# Patient Record
Sex: FEMALE | Race: WHITE | NOT HISPANIC OR LATINO | Employment: OTHER | URBAN - METROPOLITAN AREA
[De-identification: names, ages, dates, MRNs, and addresses within clinical notes are randomized per-mention and may not be internally consistent; named-entity substitution may affect disease eponyms.]

---

## 2017-04-18 ENCOUNTER — IMPORTED ENCOUNTER (OUTPATIENT)
Dept: URBAN - METROPOLITAN AREA CLINIC 31 | Facility: CLINIC | Age: 62
End: 2017-04-18

## 2017-04-18 PROBLEM — H40.11X3: Noted: 2017-04-18

## 2017-04-18 PROBLEM — H18.603: Noted: 2017-04-18

## 2017-04-18 PROBLEM — H25.12: Noted: 2017-04-18

## 2017-04-18 PROBLEM — H40.11X2: Noted: 2017-04-18

## 2017-04-18 PROBLEM — Z94.7: Noted: 2017-04-18

## 2017-04-18 PROCEDURE — 99214 OFFICE O/P EST MOD 30 MIN: CPT

## 2017-08-18 ENCOUNTER — IMPORTED ENCOUNTER (OUTPATIENT)
Dept: URBAN - METROPOLITAN AREA CLINIC 31 | Facility: CLINIC | Age: 62
End: 2017-08-18

## 2017-08-18 PROBLEM — H18.603: Noted: 2017-08-18

## 2017-08-18 PROBLEM — H25.12: Noted: 2017-08-18

## 2017-08-18 PROBLEM — Z94.7: Noted: 2017-08-18

## 2017-08-18 PROBLEM — H40.1132: Noted: 2017-08-18

## 2017-08-18 PROCEDURE — 92014 COMPRE OPH EXAM EST PT 1/>: CPT

## 2017-08-18 PROCEDURE — 92133 CPTRZD OPH DX IMG PST SGM ON: CPT

## 2017-12-19 ENCOUNTER — IMPORTED ENCOUNTER (OUTPATIENT)
Dept: URBAN - METROPOLITAN AREA CLINIC 31 | Facility: CLINIC | Age: 62
End: 2017-12-19

## 2017-12-19 PROBLEM — T86.840: Noted: 2017-12-19

## 2017-12-19 PROBLEM — H40.1132: Noted: 2017-12-19

## 2017-12-19 PROBLEM — H18.603: Noted: 2017-12-19

## 2017-12-19 PROCEDURE — 99213 OFFICE O/P EST LOW 20 MIN: CPT

## 2017-12-29 ENCOUNTER — IMPORTED ENCOUNTER (OUTPATIENT)
Dept: URBAN - METROPOLITAN AREA CLINIC 31 | Facility: CLINIC | Age: 62
End: 2017-12-29

## 2017-12-29 PROBLEM — H40.1132: Noted: 2017-12-29

## 2017-12-29 PROBLEM — H18.603: Noted: 2017-12-29

## 2017-12-29 PROBLEM — T86.840: Noted: 2017-12-29

## 2017-12-29 PROCEDURE — 99213 OFFICE O/P EST LOW 20 MIN: CPT

## 2018-01-05 ENCOUNTER — IMPORTED ENCOUNTER (OUTPATIENT)
Dept: URBAN - METROPOLITAN AREA CLINIC 31 | Facility: CLINIC | Age: 63
End: 2018-01-05

## 2018-01-05 PROCEDURE — 99213 OFFICE O/P EST LOW 20 MIN: CPT

## 2018-01-08 PROBLEM — H16.142: Noted: 2018-01-08

## 2018-01-08 PROBLEM — H18.603: Noted: 2018-01-05

## 2018-01-08 PROBLEM — T86.840: Noted: 2018-01-05

## 2018-01-08 PROBLEM — H04.122: Noted: 2018-01-05

## 2018-01-16 ENCOUNTER — IMPORTED ENCOUNTER (OUTPATIENT)
Dept: URBAN - METROPOLITAN AREA CLINIC 31 | Facility: CLINIC | Age: 63
End: 2018-01-16

## 2018-01-16 PROBLEM — H40.042: Noted: 2018-01-16

## 2018-01-16 PROBLEM — H40.1132: Noted: 2018-01-16

## 2018-01-16 PROBLEM — H04.122: Noted: 2018-01-16

## 2018-01-16 PROBLEM — H18.603: Noted: 2018-01-16

## 2018-01-16 PROBLEM — T86.840: Noted: 2018-01-16

## 2018-01-16 PROCEDURE — 99213 OFFICE O/P EST LOW 20 MIN: CPT

## 2018-01-31 ENCOUNTER — IMPORTED ENCOUNTER (OUTPATIENT)
Dept: URBAN - METROPOLITAN AREA CLINIC 31 | Facility: CLINIC | Age: 63
End: 2018-01-31

## 2018-01-31 PROBLEM — H18.603: Noted: 2018-01-31

## 2018-01-31 PROBLEM — T86.840: Noted: 2018-01-31

## 2018-01-31 PROBLEM — H40.1132: Noted: 2018-01-31

## 2018-01-31 PROBLEM — H04.122: Noted: 2018-01-31

## 2018-01-31 PROBLEM — H40.042: Noted: 2018-01-31

## 2018-01-31 PROCEDURE — 99213 OFFICE O/P EST LOW 20 MIN: CPT

## 2018-02-07 ENCOUNTER — IMPORTED ENCOUNTER (OUTPATIENT)
Dept: URBAN - METROPOLITAN AREA CLINIC 31 | Facility: CLINIC | Age: 63
End: 2018-02-07

## 2018-02-07 PROBLEM — H18.601: Noted: 2018-02-07

## 2018-02-07 PROBLEM — Z94.7: Noted: 2018-02-07

## 2018-02-07 PROCEDURE — 99213 OFFICE O/P EST LOW 20 MIN: CPT

## 2018-02-14 ENCOUNTER — IMPORTED ENCOUNTER (OUTPATIENT)
Dept: URBAN - METROPOLITAN AREA CLINIC 31 | Facility: CLINIC | Age: 63
End: 2018-02-14

## 2018-02-14 PROBLEM — H18.603: Noted: 2018-02-14

## 2018-02-14 PROBLEM — Z94.7: Noted: 2018-02-14

## 2018-02-14 PROBLEM — H40.043: Noted: 2018-02-14

## 2018-02-14 PROBLEM — H16.223: Noted: 2018-02-14

## 2018-02-14 PROCEDURE — 99213 OFFICE O/P EST LOW 20 MIN: CPT

## 2018-02-15 ENCOUNTER — IMPORTED ENCOUNTER (OUTPATIENT)
Dept: URBAN - METROPOLITAN AREA CLINIC 31 | Facility: CLINIC | Age: 63
End: 2018-02-15

## 2018-03-05 ENCOUNTER — IMPORTED ENCOUNTER (OUTPATIENT)
Dept: URBAN - METROPOLITAN AREA CLINIC 31 | Facility: CLINIC | Age: 63
End: 2018-03-05

## 2018-03-15 ENCOUNTER — IMPORTED ENCOUNTER (OUTPATIENT)
Dept: URBAN - METROPOLITAN AREA CLINIC 31 | Facility: CLINIC | Age: 63
End: 2018-03-15

## 2018-03-15 PROBLEM — Z94.7: Noted: 2018-03-15

## 2018-03-15 PROBLEM — H18.601: Noted: 2018-03-15

## 2018-03-15 PROBLEM — H40.042: Noted: 2018-03-15

## 2018-03-15 PROCEDURE — 99213 OFFICE O/P EST LOW 20 MIN: CPT

## 2018-06-06 ENCOUNTER — IMPORTED ENCOUNTER (OUTPATIENT)
Dept: URBAN - METROPOLITAN AREA CLINIC 31 | Facility: CLINIC | Age: 63
End: 2018-06-06

## 2018-06-06 PROBLEM — H40.1132: Noted: 2018-06-06

## 2018-06-06 PROBLEM — H18.601: Noted: 2018-06-06

## 2018-06-06 PROBLEM — Z94.7: Noted: 2018-06-06

## 2018-06-06 PROCEDURE — 99214 OFFICE O/P EST MOD 30 MIN: CPT

## 2018-06-06 PROCEDURE — 92083 EXTENDED VISUAL FIELD XM: CPT

## 2018-07-13 ENCOUNTER — IMPORTED ENCOUNTER (OUTPATIENT)
Dept: URBAN - METROPOLITAN AREA CLINIC 31 | Facility: CLINIC | Age: 63
End: 2018-07-13

## 2018-07-13 PROBLEM — H40.1132: Noted: 2018-07-13

## 2018-07-13 PROBLEM — H10.32: Noted: 2018-07-13

## 2018-07-13 PROBLEM — H18.601: Noted: 2018-07-13

## 2018-07-13 PROCEDURE — 99213 OFFICE O/P EST LOW 20 MIN: CPT

## 2018-07-17 ENCOUNTER — IMPORTED ENCOUNTER (OUTPATIENT)
Dept: URBAN - METROPOLITAN AREA CLINIC 31 | Facility: CLINIC | Age: 63
End: 2018-07-17

## 2018-07-17 PROBLEM — Z94.7: Noted: 2018-07-17

## 2018-07-17 PROBLEM — H18.601: Noted: 2018-07-17

## 2018-07-17 PROCEDURE — 99213 OFFICE O/P EST LOW 20 MIN: CPT

## 2018-08-15 ENCOUNTER — IMPORTED ENCOUNTER (OUTPATIENT)
Dept: URBAN - METROPOLITAN AREA CLINIC 31 | Facility: CLINIC | Age: 63
End: 2018-08-15

## 2018-08-15 PROBLEM — H25.12: Noted: 2018-08-15

## 2018-08-15 PROBLEM — Z94.7: Noted: 2018-08-15

## 2018-08-15 PROBLEM — H40.1132: Noted: 2018-08-15

## 2018-08-15 PROBLEM — H18.601: Noted: 2018-08-15

## 2018-08-15 PROCEDURE — 99214 OFFICE O/P EST MOD 30 MIN: CPT

## 2018-08-28 ENCOUNTER — IMPORTED ENCOUNTER (OUTPATIENT)
Dept: URBAN - METROPOLITAN AREA CLINIC 31 | Facility: CLINIC | Age: 63
End: 2018-08-28

## 2018-08-28 PROBLEM — H25.12: Noted: 2018-08-28

## 2018-08-28 PROBLEM — Z94.7: Noted: 2018-08-28

## 2018-08-28 PROBLEM — H40.1132: Noted: 2018-08-28

## 2018-08-28 PROBLEM — H18.601: Noted: 2018-08-28

## 2018-08-28 PROCEDURE — 99213 OFFICE O/P EST LOW 20 MIN: CPT

## 2018-09-07 ENCOUNTER — IMPORTED ENCOUNTER (OUTPATIENT)
Dept: URBAN - METROPOLITAN AREA CLINIC 31 | Facility: CLINIC | Age: 63
End: 2018-09-07

## 2018-09-07 PROBLEM — H40.1132: Noted: 2018-09-07

## 2018-09-07 PROBLEM — H18.601: Noted: 2018-09-07

## 2018-09-07 PROBLEM — Z94.7: Noted: 2018-09-07

## 2018-09-07 PROCEDURE — 99213 OFFICE O/P EST LOW 20 MIN: CPT

## 2019-01-23 ENCOUNTER — IMPORTED ENCOUNTER (OUTPATIENT)
Dept: URBAN - METROPOLITAN AREA CLINIC 31 | Facility: CLINIC | Age: 64
End: 2019-01-23

## 2019-01-23 PROBLEM — H25.12: Noted: 2019-01-23

## 2019-01-23 PROBLEM — H40.042: Noted: 2019-01-23

## 2019-01-23 PROBLEM — Z94.7: Noted: 2019-01-23

## 2019-01-23 PROBLEM — H18.603: Noted: 2019-01-23

## 2019-01-23 PROBLEM — H44.23: Noted: 2019-01-23

## 2019-01-23 PROCEDURE — 92133 CPTRZD OPH DX IMG PST SGM ON: CPT

## 2019-01-23 PROCEDURE — 99214 OFFICE O/P EST MOD 30 MIN: CPT

## 2019-01-25 ENCOUNTER — IMPORTED ENCOUNTER (OUTPATIENT)
Dept: URBAN - METROPOLITAN AREA CLINIC 31 | Facility: CLINIC | Age: 64
End: 2019-01-25

## 2019-01-25 PROBLEM — H25.12: Noted: 2019-01-25

## 2019-01-25 PROBLEM — Z94.7: Noted: 2019-01-25

## 2019-01-25 PROBLEM — H18.603: Noted: 2019-01-25

## 2019-01-25 PROBLEM — H40.042: Noted: 2019-01-25

## 2019-01-25 PROBLEM — H44.23: Noted: 2019-01-25

## 2019-01-25 PROCEDURE — 99213 OFFICE O/P EST LOW 20 MIN: CPT

## 2019-01-25 PROCEDURE — 92025 CPTRIZED CORNEAL TOPOGRAPHY: CPT

## 2019-05-20 ENCOUNTER — IMPORTED ENCOUNTER (OUTPATIENT)
Dept: URBAN - METROPOLITAN AREA CLINIC 31 | Facility: CLINIC | Age: 64
End: 2019-05-20

## 2019-05-20 PROBLEM — Z94.7: Noted: 2019-05-20

## 2019-05-20 PROBLEM — H40.042: Noted: 2019-05-20

## 2019-05-20 PROBLEM — H44.23: Noted: 2019-05-20

## 2019-05-20 PROBLEM — H25.12: Noted: 2019-05-20

## 2019-05-20 PROBLEM — H18.603: Noted: 2019-05-20

## 2019-05-20 PROCEDURE — 99213 OFFICE O/P EST LOW 20 MIN: CPT

## 2019-05-31 ENCOUNTER — IMPORTED ENCOUNTER (OUTPATIENT)
Dept: URBAN - METROPOLITAN AREA CLINIC 31 | Facility: CLINIC | Age: 64
End: 2019-05-31

## 2019-05-31 PROBLEM — Z94.7: Noted: 2019-05-31

## 2019-05-31 PROBLEM — H44.23: Noted: 2019-05-31

## 2019-05-31 PROBLEM — H40.1132: Noted: 2019-05-31

## 2019-05-31 PROBLEM — H18.601: Noted: 2019-05-31

## 2019-05-31 PROBLEM — H25.12: Noted: 2019-05-31

## 2019-05-31 PROCEDURE — 92083 EXTENDED VISUAL FIELD XM: CPT

## 2019-05-31 PROCEDURE — 99214 OFFICE O/P EST MOD 30 MIN: CPT

## 2019-08-30 ENCOUNTER — IMPORTED ENCOUNTER (OUTPATIENT)
Dept: URBAN - METROPOLITAN AREA CLINIC 31 | Facility: CLINIC | Age: 64
End: 2019-08-30

## 2019-08-30 PROBLEM — H25.12: Noted: 2019-08-30

## 2019-08-30 PROBLEM — H44.23: Noted: 2019-08-30

## 2019-08-30 PROBLEM — Z94.7: Noted: 2019-08-30

## 2019-08-30 PROBLEM — H40.1132: Noted: 2019-08-30

## 2019-08-30 PROBLEM — H18.601: Noted: 2019-08-30

## 2019-08-30 PROCEDURE — 99213 OFFICE O/P EST LOW 20 MIN: CPT

## 2019-09-16 NOTE — PATIENT DISCUSSION
CHALAZION OS: PRESCRIBED WARM COMPRESSES, EYELID SCRUBS AND DOXYCYCLINE 100MG BID PO X 2 WEEKS AND TOBRADEX RISHABH QHS X 2 WEEKS. CHALAZION INCISION AND DRAINAGE DONE TODAY.

## 2019-09-25 ENCOUNTER — IMPORTED ENCOUNTER (OUTPATIENT)
Dept: URBAN - METROPOLITAN AREA CLINIC 31 | Facility: CLINIC | Age: 64
End: 2019-09-25

## 2019-09-25 PROBLEM — Z94.7: Noted: 2019-09-25

## 2019-09-25 PROBLEM — H18.601: Noted: 2019-09-25

## 2019-09-25 PROBLEM — H25.12: Noted: 2019-09-25

## 2019-09-25 PROBLEM — H44.23: Noted: 2019-09-25

## 2019-09-25 PROBLEM — H40.1132: Noted: 2019-09-25

## 2019-09-25 PROCEDURE — 99214 OFFICE O/P EST MOD 30 MIN: CPT

## 2019-12-20 ENCOUNTER — IMPORTED ENCOUNTER (OUTPATIENT)
Dept: URBAN - METROPOLITAN AREA CLINIC 31 | Facility: CLINIC | Age: 64
End: 2019-12-20

## 2019-12-20 PROBLEM — H25.13: Noted: 2019-12-20

## 2019-12-20 PROBLEM — H18.601: Noted: 2019-12-20

## 2019-12-20 PROBLEM — Z94.7: Noted: 2019-12-20

## 2019-12-20 PROBLEM — H44.23: Noted: 2019-12-20

## 2019-12-20 PROBLEM — H25.12: Noted: 2019-12-20

## 2019-12-20 PROBLEM — H40.1132: Noted: 2019-12-20

## 2019-12-20 PROCEDURE — 92014 COMPRE OPH EXAM EST PT 1/>: CPT

## 2019-12-20 PROCEDURE — 92133 CPTRZD OPH DX IMG PST SGM ON: CPT

## 2020-01-10 ENCOUNTER — IMPORTED ENCOUNTER (OUTPATIENT)
Dept: URBAN - METROPOLITAN AREA CLINIC 31 | Facility: CLINIC | Age: 65
End: 2020-01-10

## 2020-01-10 PROBLEM — H40.1132: Noted: 2020-01-10

## 2020-01-10 PROBLEM — H25.12: Noted: 2020-01-10

## 2020-01-10 PROBLEM — Z94.7: Noted: 2020-01-10

## 2020-01-10 PROCEDURE — 92286 ANT SGM IMG I&R SPECLR MIC: CPT

## 2020-01-10 PROCEDURE — 92136 OPHTHALMIC BIOMETRY: CPT

## 2020-01-10 PROCEDURE — 92025 CPTRIZED CORNEAL TOPOGRAPHY: CPT

## 2020-01-21 ENCOUNTER — IMPORTED ENCOUNTER (OUTPATIENT)
Dept: URBAN - METROPOLITAN AREA CLINIC 31 | Facility: CLINIC | Age: 65
End: 2020-01-21

## 2020-01-21 PROBLEM — Z96.1: Noted: 2020-01-21

## 2020-01-21 PROCEDURE — 99024 POSTOP FOLLOW-UP VISIT: CPT

## 2020-01-22 ENCOUNTER — IMPORTED ENCOUNTER (OUTPATIENT)
Dept: URBAN - METROPOLITAN AREA CLINIC 31 | Facility: CLINIC | Age: 65
End: 2020-01-22

## 2020-01-22 PROBLEM — Z96.1: Noted: 2020-01-22

## 2020-01-24 ENCOUNTER — IMPORTED ENCOUNTER (OUTPATIENT)
Dept: URBAN - METROPOLITAN AREA CLINIC 31 | Facility: CLINIC | Age: 65
End: 2020-01-24

## 2020-01-24 PROBLEM — Z98.89: Noted: 2020-01-24

## 2020-01-24 PROCEDURE — 99024 POSTOP FOLLOW-UP VISIT: CPT

## 2020-01-29 ENCOUNTER — IMPORTED ENCOUNTER (OUTPATIENT)
Dept: URBAN - METROPOLITAN AREA CLINIC 31 | Facility: CLINIC | Age: 65
End: 2020-01-29

## 2020-01-29 PROBLEM — Z98.89: Noted: 2020-01-29

## 2020-01-29 PROBLEM — Z96.1: Noted: 2020-01-29

## 2020-01-29 PROCEDURE — 99024 POSTOP FOLLOW-UP VISIT: CPT

## 2020-02-10 ENCOUNTER — IMPORTED ENCOUNTER (OUTPATIENT)
Dept: URBAN - METROPOLITAN AREA CLINIC 31 | Facility: CLINIC | Age: 65
End: 2020-02-10

## 2020-02-10 PROBLEM — Z98.89: Noted: 2020-02-10

## 2020-02-10 PROCEDURE — 99024 POSTOP FOLLOW-UP VISIT: CPT

## 2020-02-10 PROCEDURE — 92310 CONTACT LENS FITTING OU: CPT

## 2020-03-04 ENCOUNTER — IMPORTED ENCOUNTER (OUTPATIENT)
Dept: URBAN - METROPOLITAN AREA CLINIC 31 | Facility: CLINIC | Age: 65
End: 2020-03-04

## 2020-03-13 ENCOUNTER — IMPORTED ENCOUNTER (OUTPATIENT)
Dept: URBAN - METROPOLITAN AREA CLINIC 31 | Facility: CLINIC | Age: 65
End: 2020-03-13

## 2020-03-13 PROBLEM — Z96.1: Noted: 2020-03-13

## 2020-03-13 PROCEDURE — 99024 POSTOP FOLLOW-UP VISIT: CPT

## 2020-03-16 ENCOUNTER — IMPORTED ENCOUNTER (OUTPATIENT)
Dept: URBAN - METROPOLITAN AREA CLINIC 31 | Facility: CLINIC | Age: 65
End: 2020-03-16

## 2020-03-16 PROBLEM — H25.11: Noted: 2020-03-16

## 2020-03-16 PROBLEM — H18.601: Noted: 2020-03-16

## 2020-03-16 PROBLEM — H40.1132: Noted: 2020-03-16

## 2020-03-16 PROBLEM — T86.840: Noted: 2020-03-16

## 2020-03-16 PROBLEM — Z96.1: Noted: 2020-03-16

## 2020-03-16 PROCEDURE — 99024 POSTOP FOLLOW-UP VISIT: CPT

## 2020-03-17 ENCOUNTER — IMPORTED ENCOUNTER (OUTPATIENT)
Dept: URBAN - METROPOLITAN AREA CLINIC 31 | Facility: CLINIC | Age: 65
End: 2020-03-17

## 2020-03-17 PROBLEM — T86.840: Noted: 2020-03-17

## 2020-03-17 PROBLEM — Z98.89: Noted: 2020-03-17

## 2020-03-17 PROBLEM — Z96.1: Noted: 2020-03-17

## 2020-03-17 PROCEDURE — 99024 POSTOP FOLLOW-UP VISIT: CPT

## 2020-03-24 ENCOUNTER — IMPORTED ENCOUNTER (OUTPATIENT)
Dept: URBAN - METROPOLITAN AREA CLINIC 31 | Facility: CLINIC | Age: 65
End: 2020-03-24

## 2020-03-24 PROBLEM — Z96.1: Noted: 2020-03-24

## 2020-03-24 PROBLEM — T86.840: Noted: 2020-03-24

## 2020-03-24 PROCEDURE — 99024 POSTOP FOLLOW-UP VISIT: CPT

## 2020-03-26 ENCOUNTER — IMPORTED ENCOUNTER (OUTPATIENT)
Dept: URBAN - METROPOLITAN AREA CLINIC 31 | Facility: CLINIC | Age: 65
End: 2020-03-26

## 2020-03-26 PROBLEM — Z96.1: Noted: 2020-03-26

## 2020-03-26 PROBLEM — T86.840: Noted: 2020-03-26

## 2020-03-26 PROCEDURE — 99024 POSTOP FOLLOW-UP VISIT: CPT

## 2020-03-31 ENCOUNTER — IMPORTED ENCOUNTER (OUTPATIENT)
Dept: URBAN - METROPOLITAN AREA CLINIC 31 | Facility: CLINIC | Age: 65
End: 2020-03-31

## 2020-03-31 PROBLEM — Z96.1: Noted: 2020-03-31

## 2020-03-31 PROCEDURE — 99024 POSTOP FOLLOW-UP VISIT: CPT

## 2020-04-14 ENCOUNTER — IMPORTED ENCOUNTER (OUTPATIENT)
Dept: URBAN - METROPOLITAN AREA CLINIC 31 | Facility: CLINIC | Age: 65
End: 2020-04-14

## 2020-04-14 PROBLEM — T86.840: Noted: 2020-04-14

## 2020-04-14 PROBLEM — Z96.1: Noted: 2020-04-14

## 2020-04-14 PROCEDURE — 99024 POSTOP FOLLOW-UP VISIT: CPT

## 2020-04-28 ENCOUNTER — IMPORTED ENCOUNTER (OUTPATIENT)
Dept: URBAN - METROPOLITAN AREA CLINIC 31 | Facility: CLINIC | Age: 65
End: 2020-04-28

## 2020-04-28 PROBLEM — Z96.1: Noted: 2020-04-28

## 2020-04-28 PROBLEM — Z94.7: Noted: 2020-04-28

## 2020-04-28 PROBLEM — H40.1132: Noted: 2020-04-28

## 2020-04-28 PROBLEM — H18.603: Noted: 2020-04-28

## 2020-05-11 ENCOUNTER — IMPORTED ENCOUNTER (OUTPATIENT)
Dept: URBAN - METROPOLITAN AREA CLINIC 31 | Facility: CLINIC | Age: 65
End: 2020-05-11

## 2020-05-11 PROBLEM — Z96.1: Noted: 2020-05-11

## 2020-05-11 PROBLEM — H40.1132: Noted: 2020-05-11

## 2020-05-11 PROBLEM — Z94.7: Noted: 2020-05-11

## 2020-05-11 PROBLEM — H18.603: Noted: 2020-05-11

## 2020-05-11 PROCEDURE — 99213 OFFICE O/P EST LOW 20 MIN: CPT

## 2020-05-11 PROCEDURE — 92015 DETERMINE REFRACTIVE STATE: CPT

## 2020-05-18 ENCOUNTER — IMPORTED ENCOUNTER (OUTPATIENT)
Dept: URBAN - METROPOLITAN AREA CLINIC 31 | Facility: CLINIC | Age: 65
End: 2020-05-18

## 2020-05-18 PROBLEM — Z96.1: Noted: 2020-05-18

## 2020-05-18 PROBLEM — H40.1132: Noted: 2020-05-18

## 2020-05-18 PROBLEM — Z94.7: Noted: 2020-05-18

## 2020-05-18 PROBLEM — H18.603: Noted: 2020-05-18

## 2020-05-18 PROCEDURE — 99213 OFFICE O/P EST LOW 20 MIN: CPT

## 2020-05-27 ENCOUNTER — IMPORTED ENCOUNTER (OUTPATIENT)
Dept: URBAN - METROPOLITAN AREA CLINIC 31 | Facility: CLINIC | Age: 65
End: 2020-05-27

## 2020-05-27 PROBLEM — H40.1132: Noted: 2020-05-27

## 2020-05-27 PROBLEM — Z96.1: Noted: 2020-05-27

## 2020-05-27 PROBLEM — H18.601: Noted: 2020-05-27

## 2020-05-27 PROBLEM — Z94.7: Noted: 2020-05-27

## 2020-05-27 PROCEDURE — 99213 OFFICE O/P EST LOW 20 MIN: CPT

## 2020-06-10 ENCOUNTER — IMPORTED ENCOUNTER (OUTPATIENT)
Dept: URBAN - METROPOLITAN AREA CLINIC 31 | Facility: CLINIC | Age: 65
End: 2020-06-10

## 2020-06-10 PROBLEM — Z96.1: Noted: 2020-06-10

## 2020-06-10 PROBLEM — Z94.7: Noted: 2020-06-10

## 2020-06-10 PROBLEM — H40.1132: Noted: 2020-06-10

## 2020-06-10 PROBLEM — H18.601: Noted: 2020-06-10

## 2020-06-10 PROCEDURE — 92012 INTRM OPH EXAM EST PATIENT: CPT

## 2020-06-26 ENCOUNTER — IMPORTED ENCOUNTER (OUTPATIENT)
Dept: URBAN - METROPOLITAN AREA CLINIC 31 | Facility: CLINIC | Age: 65
End: 2020-06-26

## 2020-07-01 ENCOUNTER — IMPORTED ENCOUNTER (OUTPATIENT)
Dept: URBAN - METROPOLITAN AREA CLINIC 31 | Facility: CLINIC | Age: 65
End: 2020-07-01

## 2020-07-01 PROBLEM — H18.601: Noted: 2020-07-01

## 2020-07-01 PROBLEM — H40.1132: Noted: 2020-07-01

## 2020-07-01 PROBLEM — Z94.7: Noted: 2020-07-01

## 2020-07-01 PROBLEM — Z96.1: Noted: 2020-07-01

## 2020-07-01 PROCEDURE — 99213 OFFICE O/P EST LOW 20 MIN: CPT

## 2020-07-29 ENCOUNTER — IMPORTED ENCOUNTER (OUTPATIENT)
Dept: URBAN - METROPOLITAN AREA CLINIC 31 | Facility: CLINIC | Age: 65
End: 2020-07-29

## 2020-07-29 PROBLEM — H40.1132: Noted: 2020-07-29

## 2020-07-29 PROBLEM — Z96.1: Noted: 2020-07-29

## 2020-07-29 PROBLEM — H18.601: Noted: 2020-07-29

## 2020-07-29 PROBLEM — Z94.7: Noted: 2020-07-29

## 2020-07-29 PROCEDURE — 99213 OFFICE O/P EST LOW 20 MIN: CPT

## 2020-08-20 ENCOUNTER — IMPORTED ENCOUNTER (OUTPATIENT)
Dept: URBAN - METROPOLITAN AREA CLINIC 31 | Facility: CLINIC | Age: 65
End: 2020-08-20

## 2020-08-20 PROBLEM — H18.601: Noted: 2020-08-20

## 2020-08-20 PROBLEM — Z96.1: Noted: 2020-08-20

## 2020-08-20 PROBLEM — H40.1132: Noted: 2020-08-20

## 2020-08-20 PROBLEM — Z94.7: Noted: 2020-08-20

## 2020-08-20 PROCEDURE — 99213 OFFICE O/P EST LOW 20 MIN: CPT

## 2020-09-17 ENCOUNTER — IMPORTED ENCOUNTER (OUTPATIENT)
Dept: URBAN - METROPOLITAN AREA CLINIC 31 | Facility: CLINIC | Age: 65
End: 2020-09-17

## 2020-09-17 PROBLEM — Z94.7: Noted: 2020-09-17

## 2020-09-17 PROBLEM — H40.1132: Noted: 2020-09-17

## 2020-09-17 PROBLEM — Z96.1: Noted: 2020-09-17

## 2020-09-17 PROBLEM — H18.601: Noted: 2020-09-17

## 2020-09-17 PROCEDURE — 99213 OFFICE O/P EST LOW 20 MIN: CPT

## 2020-10-01 ENCOUNTER — IMPORTED ENCOUNTER (OUTPATIENT)
Dept: URBAN - METROPOLITAN AREA CLINIC 31 | Facility: CLINIC | Age: 65
End: 2020-10-01

## 2020-10-01 PROBLEM — H40.1132: Noted: 2020-10-01

## 2020-10-01 PROBLEM — Z96.1: Noted: 2020-10-01

## 2020-10-01 PROBLEM — Z94.7: Noted: 2020-10-01

## 2020-10-01 PROBLEM — H18.601: Noted: 2020-10-01

## 2020-10-01 PROCEDURE — 99213 OFFICE O/P EST LOW 20 MIN: CPT

## 2020-11-06 ENCOUNTER — IMPORTED ENCOUNTER (OUTPATIENT)
Dept: URBAN - METROPOLITAN AREA CLINIC 31 | Facility: CLINIC | Age: 65
End: 2020-11-06

## 2020-11-06 PROBLEM — H40.1132: Noted: 2020-11-06

## 2020-11-06 PROBLEM — H18.601: Noted: 2020-11-06

## 2020-11-06 PROBLEM — H26.492: Noted: 2020-11-06

## 2020-11-06 PROBLEM — Z96.1: Noted: 2020-11-06

## 2020-11-06 PROBLEM — Z94.7: Noted: 2020-11-06

## 2020-11-06 PROCEDURE — 92083 EXTENDED VISUAL FIELD XM: CPT

## 2020-11-06 PROCEDURE — 92012 INTRM OPH EXAM EST PATIENT: CPT

## 2020-12-04 ENCOUNTER — IMPORTED ENCOUNTER (OUTPATIENT)
Dept: URBAN - METROPOLITAN AREA CLINIC 31 | Facility: CLINIC | Age: 65
End: 2020-12-04

## 2020-12-04 PROBLEM — Z98.89: Noted: 2020-12-04

## 2020-12-04 PROCEDURE — 99024 POSTOP FOLLOW-UP VISIT: CPT

## 2021-01-04 ENCOUNTER — IMPORTED ENCOUNTER (OUTPATIENT)
Dept: URBAN - METROPOLITAN AREA CLINIC 31 | Facility: CLINIC | Age: 66
End: 2021-01-04

## 2021-01-04 PROBLEM — Z94.7: Noted: 2021-01-04

## 2021-01-04 PROBLEM — Z98.89: Noted: 2021-01-04

## 2021-01-04 PROBLEM — Z96.1: Noted: 2021-01-04

## 2021-01-04 PROBLEM — H40.1132: Noted: 2021-01-04

## 2021-01-04 PROBLEM — H18.601: Noted: 2021-01-04

## 2021-01-04 PROCEDURE — 99213 OFFICE O/P EST LOW 20 MIN: CPT

## 2021-01-04 PROCEDURE — 92134 CPTRZ OPH DX IMG PST SGM RTA: CPT

## 2021-01-04 PROCEDURE — 92250 FUNDUS PHOTOGRAPHY W/I&R: CPT

## 2021-01-12 ENCOUNTER — NEW REFERRAL (OUTPATIENT)
Dept: URBAN - METROPOLITAN AREA CLINIC 26 | Facility: CLINIC | Age: 66
End: 2021-01-12

## 2021-01-12 VITALS
BODY MASS INDEX: 22.98 KG/M2 | WEIGHT: 143 LBS | HEART RATE: 74 BPM | SYSTOLIC BLOOD PRESSURE: 148 MMHG | DIASTOLIC BLOOD PRESSURE: 96 MMHG | HEIGHT: 66 IN

## 2021-01-12 DIAGNOSIS — H04.123: ICD-10-CM

## 2021-01-12 DIAGNOSIS — H44.23: ICD-10-CM

## 2021-01-12 DIAGNOSIS — H02.831: ICD-10-CM

## 2021-01-12 DIAGNOSIS — H43.812: ICD-10-CM

## 2021-01-12 DIAGNOSIS — Z94.7: ICD-10-CM

## 2021-01-12 DIAGNOSIS — H40.9: ICD-10-CM

## 2021-01-12 DIAGNOSIS — H25.11: ICD-10-CM

## 2021-01-12 DIAGNOSIS — H02.834: ICD-10-CM

## 2021-01-12 DIAGNOSIS — H35.352: ICD-10-CM

## 2021-01-12 PROCEDURE — 92235 FLUORESCEIN ANGRPH MLTIFRAME: CPT

## 2021-01-12 PROCEDURE — 92250 FUNDUS PHOTOGRAPHY W/I&R: CPT

## 2021-01-12 PROCEDURE — 92004 COMPRE OPH EXAM NEW PT 1/>: CPT

## 2021-01-12 PROCEDURE — 92134 CPTRZ OPH DX IMG PST SGM RTA: CPT

## 2021-01-12 RX ORDER — LOTEPREDNOL ETABONATE 10 MG/ML: 1 SUSPENSION TOPICAL

## 2021-01-12 RX ORDER — BROMFENAC 0.9 MG/ML: 1 SOLUTION/ DROPS OPHTHALMIC TWICE A DAY

## 2021-01-12 ASSESSMENT — VISUAL ACUITY
OD_CC: 20/30
OS_CC: 20/40
OS_SC: 20/400-1
OD_SC: CF 1FT

## 2021-01-12 ASSESSMENT — TONOMETRY
OS_IOP_MMHG: 13
OD_IOP_MMHG: 11

## 2021-01-28 ENCOUNTER — FOLLOW UP (OUTPATIENT)
Dept: URBAN - METROPOLITAN AREA CLINIC 26 | Facility: CLINIC | Age: 66
End: 2021-01-28

## 2021-01-28 DIAGNOSIS — Z94.7: ICD-10-CM

## 2021-01-28 DIAGNOSIS — H35.352: ICD-10-CM

## 2021-01-28 DIAGNOSIS — H43.812: ICD-10-CM

## 2021-01-28 DIAGNOSIS — H40.9: ICD-10-CM

## 2021-01-28 DIAGNOSIS — H44.23: ICD-10-CM

## 2021-01-28 PROCEDURE — 92134 CPTRZ OPH DX IMG PST SGM RTA: CPT

## 2021-01-28 PROCEDURE — 92014 COMPRE OPH EXAM EST PT 1/>: CPT

## 2021-01-28 ASSESSMENT — TONOMETRY
OS_IOP_MMHG: 13
OD_IOP_MMHG: 12

## 2021-01-28 ASSESSMENT — VISUAL ACUITY
OD_CC: 20/30-2
OS_CC: 20/40-1

## 2021-02-23 ENCOUNTER — FOLLOW UP (OUTPATIENT)
Dept: URBAN - METROPOLITAN AREA CLINIC 26 | Facility: CLINIC | Age: 66
End: 2021-02-23

## 2021-02-23 DIAGNOSIS — H43.812: ICD-10-CM

## 2021-02-23 DIAGNOSIS — H44.23: ICD-10-CM

## 2021-02-23 DIAGNOSIS — H35.352: ICD-10-CM

## 2021-02-23 DIAGNOSIS — H40.9: ICD-10-CM

## 2021-02-23 DIAGNOSIS — H35.371: ICD-10-CM

## 2021-02-23 PROCEDURE — 92134 CPTRZ OPH DX IMG PST SGM RTA: CPT

## 2021-02-23 PROCEDURE — 92014 COMPRE OPH EXAM EST PT 1/>: CPT

## 2021-02-23 ASSESSMENT — TONOMETRY
OS_IOP_MMHG: 14
OD_IOP_MMHG: 13

## 2021-02-23 ASSESSMENT — VISUAL ACUITY
OS_CC: 20/200
OD_CC: 20/30

## 2021-03-10 ENCOUNTER — IMPORTED ENCOUNTER (OUTPATIENT)
Dept: URBAN - METROPOLITAN AREA CLINIC 31 | Facility: CLINIC | Age: 66
End: 2021-03-10

## 2021-03-10 PROBLEM — Z94.7: Noted: 2021-03-10

## 2021-03-10 PROBLEM — H35.352: Noted: 2021-03-10

## 2021-03-10 PROBLEM — Z96.1: Noted: 2021-03-10

## 2021-03-10 PROBLEM — H40.1132: Noted: 2021-03-10

## 2021-03-10 PROCEDURE — 99213 OFFICE O/P EST LOW 20 MIN: CPT

## 2021-03-15 ENCOUNTER — IMPORTED ENCOUNTER (OUTPATIENT)
Dept: URBAN - METROPOLITAN AREA CLINIC 31 | Facility: CLINIC | Age: 66
End: 2021-03-15

## 2021-03-15 PROBLEM — Z94.7: Noted: 2021-03-15

## 2021-03-15 PROBLEM — H40.1132: Noted: 2021-03-15

## 2021-03-15 PROBLEM — Z96.1: Noted: 2021-03-15

## 2021-03-15 PROBLEM — Z98.89: Noted: 2021-03-15

## 2021-03-15 PROBLEM — H18.601: Noted: 2021-03-15

## 2021-03-15 PROCEDURE — 99214 OFFICE O/P EST MOD 30 MIN: CPT

## 2021-03-26 ENCOUNTER — IMPORTED ENCOUNTER (OUTPATIENT)
Dept: URBAN - METROPOLITAN AREA CLINIC 31 | Facility: CLINIC | Age: 66
End: 2021-03-26

## 2021-03-26 PROBLEM — H40.1132: Noted: 2021-03-26

## 2021-03-26 PROBLEM — Z94.7: Noted: 2021-03-26

## 2021-03-26 PROBLEM — Z98.89: Noted: 2021-03-26

## 2021-03-26 PROBLEM — H18.601: Noted: 2021-03-26

## 2021-03-26 PROBLEM — Z96.1: Noted: 2021-03-26

## 2021-03-26 PROCEDURE — 99213 OFFICE O/P EST LOW 20 MIN: CPT

## 2021-03-30 ENCOUNTER — FOLLOW UP (OUTPATIENT)
Dept: URBAN - METROPOLITAN AREA CLINIC 26 | Facility: CLINIC | Age: 66
End: 2021-03-30

## 2021-03-30 DIAGNOSIS — H35.352: ICD-10-CM

## 2021-03-30 DIAGNOSIS — H40.9: ICD-10-CM

## 2021-03-30 DIAGNOSIS — H43.812: ICD-10-CM

## 2021-03-30 DIAGNOSIS — H44.23: ICD-10-CM

## 2021-03-30 DIAGNOSIS — H35.371: ICD-10-CM

## 2021-03-30 PROCEDURE — 92014 COMPRE OPH EXAM EST PT 1/>: CPT

## 2021-03-30 PROCEDURE — 92134 CPTRZ OPH DX IMG PST SGM RTA: CPT

## 2021-03-30 ASSESSMENT — TONOMETRY
OS_IOP_MMHG: 13
OD_IOP_MMHG: 14

## 2021-03-30 ASSESSMENT — VISUAL ACUITY
OD_CC: 20/30-2
OS_CC: 20/80-1
OS_PH: 20/70-2

## 2021-04-13 ENCOUNTER — IMPORTED ENCOUNTER (OUTPATIENT)
Dept: URBAN - METROPOLITAN AREA CLINIC 31 | Facility: CLINIC | Age: 66
End: 2021-04-13

## 2021-04-13 PROBLEM — H18.601: Noted: 2021-04-13

## 2021-04-13 PROBLEM — Z94.7: Noted: 2021-04-13

## 2021-04-13 PROBLEM — Z98.89: Noted: 2021-04-13

## 2021-04-13 PROBLEM — Z96.1: Noted: 2021-04-13

## 2021-04-13 PROBLEM — H40.1132: Noted: 2021-04-13

## 2021-04-13 PROCEDURE — 99213 OFFICE O/P EST LOW 20 MIN: CPT

## 2021-05-03 ENCOUNTER — IMPORTED ENCOUNTER (OUTPATIENT)
Dept: URBAN - METROPOLITAN AREA CLINIC 31 | Facility: CLINIC | Age: 66
End: 2021-05-03

## 2021-05-03 PROBLEM — Z94.7: Noted: 2021-05-03

## 2021-05-03 PROBLEM — H18.601: Noted: 2021-05-03

## 2021-05-03 PROBLEM — Z96.1: Noted: 2021-05-03

## 2021-05-03 PROBLEM — Z98.89: Noted: 2021-05-03

## 2021-05-03 PROBLEM — H40.1132: Noted: 2021-05-03

## 2021-05-03 PROCEDURE — 99213 OFFICE O/P EST LOW 20 MIN: CPT

## 2021-05-27 ENCOUNTER — FOLLOW UP (OUTPATIENT)
Dept: URBAN - METROPOLITAN AREA CLINIC 26 | Facility: CLINIC | Age: 66
End: 2021-05-27

## 2021-05-27 VITALS — HEIGHT: 55 IN | HEART RATE: 79 BPM | DIASTOLIC BLOOD PRESSURE: 93 MMHG | SYSTOLIC BLOOD PRESSURE: 153 MMHG

## 2021-05-27 DIAGNOSIS — H40.9: ICD-10-CM

## 2021-05-27 DIAGNOSIS — H35.352: ICD-10-CM

## 2021-05-27 DIAGNOSIS — H44.23: ICD-10-CM

## 2021-05-27 DIAGNOSIS — H35.371: ICD-10-CM

## 2021-05-27 DIAGNOSIS — Z94.7: ICD-10-CM

## 2021-05-27 DIAGNOSIS — H43.812: ICD-10-CM

## 2021-05-27 PROCEDURE — 92250 FUNDUS PHOTOGRAPHY W/I&R: CPT

## 2021-05-27 PROCEDURE — 92235 FLUORESCEIN ANGRPH MLTIFRAME: CPT

## 2021-05-27 PROCEDURE — 92134 CPTRZ OPH DX IMG PST SGM RTA: CPT

## 2021-05-27 PROCEDURE — 92014 COMPRE OPH EXAM EST PT 1/>: CPT

## 2021-05-27 RX ORDER — LOTEPREDNOL ETABONATE 10 MG/ML: 1 SUSPENSION TOPICAL

## 2021-05-27 ASSESSMENT — TONOMETRY
OS_IOP_MMHG: 19
OD_IOP_MMHG: 08

## 2021-05-27 ASSESSMENT — VISUAL ACUITY
OS_CC: 20/100
OD_CC: 20/40-1

## 2021-06-02 ENCOUNTER — IMPORTED ENCOUNTER (OUTPATIENT)
Dept: URBAN - METROPOLITAN AREA CLINIC 31 | Facility: CLINIC | Age: 66
End: 2021-06-02

## 2021-07-19 ENCOUNTER — IMPORTED ENCOUNTER (OUTPATIENT)
Dept: URBAN - METROPOLITAN AREA CLINIC 31 | Facility: CLINIC | Age: 66
End: 2021-07-19

## 2021-07-19 PROCEDURE — 99213 OFFICE O/P EST LOW 20 MIN: CPT

## 2021-07-20 ENCOUNTER — FOLLOW UP (OUTPATIENT)
Dept: URBAN - METROPOLITAN AREA CLINIC 26 | Facility: CLINIC | Age: 66
End: 2021-07-20

## 2021-07-20 DIAGNOSIS — H35.352: ICD-10-CM

## 2021-07-20 DIAGNOSIS — H40.9: ICD-10-CM

## 2021-07-20 DIAGNOSIS — H44.23: ICD-10-CM

## 2021-07-20 DIAGNOSIS — H35.371: ICD-10-CM

## 2021-07-20 DIAGNOSIS — H43.812: ICD-10-CM

## 2021-07-20 PROCEDURE — 92014 COMPRE OPH EXAM EST PT 1/>: CPT

## 2021-07-20 PROCEDURE — 92250 FUNDUS PHOTOGRAPHY W/I&R: CPT

## 2021-07-20 PROCEDURE — 92134 CPTRZ OPH DX IMG PST SGM RTA: CPT

## 2021-07-20 ASSESSMENT — TONOMETRY
OS_IOP_MMHG: 14
OS_IOP_MMHG: 18
OD_IOP_MMHG: 10

## 2021-07-20 ASSESSMENT — VISUAL ACUITY
OD_PH: 20/40
OD_CC: 20/40-2
OS_PH: 20/100-2
OS_SC: 20/400

## 2021-09-17 ENCOUNTER — IMPORTED ENCOUNTER (OUTPATIENT)
Dept: URBAN - METROPOLITAN AREA CLINIC 31 | Facility: CLINIC | Age: 66
End: 2021-09-17

## 2021-09-17 PROBLEM — H18.601: Noted: 2021-09-17

## 2021-09-17 PROBLEM — H40.1112: Noted: 2021-09-17

## 2021-09-17 PROBLEM — Z96.1: Noted: 2021-09-17

## 2021-09-17 PROBLEM — H40.042: Noted: 2021-09-17

## 2021-09-17 PROBLEM — Z94.7: Noted: 2021-09-17

## 2021-09-17 PROCEDURE — 99213 OFFICE O/P EST LOW 20 MIN: CPT

## 2021-09-20 ENCOUNTER — IMPORTED ENCOUNTER (OUTPATIENT)
Dept: URBAN - METROPOLITAN AREA CLINIC 31 | Facility: CLINIC | Age: 66
End: 2021-09-20

## 2021-09-20 PROBLEM — Z98.89: Noted: 2021-09-20

## 2021-09-20 PROBLEM — Z94.7: Noted: 2021-09-20

## 2021-09-20 PROBLEM — H40.1132: Noted: 2021-09-20

## 2021-09-20 PROBLEM — Z96.1: Noted: 2021-09-20

## 2021-09-20 PROBLEM — H18.601: Noted: 2021-09-20

## 2021-09-20 PROCEDURE — 99213 OFFICE O/P EST LOW 20 MIN: CPT

## 2021-09-20 PROCEDURE — 92025 CPTRIZED CORNEAL TOPOGRAPHY: CPT

## 2021-09-21 ENCOUNTER — FOLLOW UP (OUTPATIENT)
Dept: URBAN - METROPOLITAN AREA CLINIC 26 | Facility: CLINIC | Age: 66
End: 2021-09-21

## 2021-09-21 DIAGNOSIS — H04.123: ICD-10-CM

## 2021-09-21 DIAGNOSIS — H35.352: ICD-10-CM

## 2021-09-21 DIAGNOSIS — H40.9: ICD-10-CM

## 2021-09-21 DIAGNOSIS — H43.812: ICD-10-CM

## 2021-09-21 DIAGNOSIS — H44.23: ICD-10-CM

## 2021-09-21 DIAGNOSIS — H35.371: ICD-10-CM

## 2021-09-21 PROCEDURE — 92250 FUNDUS PHOTOGRAPHY W/I&R: CPT

## 2021-09-21 PROCEDURE — 92014 COMPRE OPH EXAM EST PT 1/>: CPT

## 2021-09-21 PROCEDURE — 92134 CPTRZ OPH DX IMG PST SGM RTA: CPT

## 2021-09-21 ASSESSMENT — TONOMETRY
OD_IOP_MMHG: 12
OS_IOP_MMHG: 20

## 2021-09-21 ASSESSMENT — VISUAL ACUITY
OD_PH: 20/30-1
OS_SC: 20/400-1
OD_CC: 20/40+2
OS_PH: 20/200-1

## 2021-10-01 ENCOUNTER — IMPORTED ENCOUNTER (OUTPATIENT)
Dept: URBAN - METROPOLITAN AREA CLINIC 31 | Facility: CLINIC | Age: 66
End: 2021-10-01

## 2021-10-18 ENCOUNTER — IMPORTED ENCOUNTER (OUTPATIENT)
Dept: URBAN - METROPOLITAN AREA CLINIC 31 | Facility: CLINIC | Age: 66
End: 2021-10-18

## 2021-10-18 PROBLEM — Z94.7: Noted: 2021-10-18

## 2021-10-18 PROBLEM — H40.1132: Noted: 2021-10-18

## 2021-10-18 PROBLEM — Z96.1: Noted: 2021-10-18

## 2021-10-18 PROBLEM — H18.601: Noted: 2021-10-18

## 2021-10-18 PROBLEM — Z98.89: Noted: 2021-10-18

## 2021-11-01 ENCOUNTER — IMPORTED ENCOUNTER (OUTPATIENT)
Dept: URBAN - METROPOLITAN AREA CLINIC 31 | Facility: CLINIC | Age: 66
End: 2021-11-01

## 2021-11-01 PROBLEM — Z94.7: Noted: 2021-11-01

## 2021-11-01 PROBLEM — H44.23: Noted: 2021-11-01

## 2021-11-01 PROBLEM — H18.601: Noted: 2021-11-01

## 2021-11-01 PROBLEM — H40.1113: Noted: 2021-11-01

## 2021-11-01 PROBLEM — Z96.1: Noted: 2021-11-01

## 2021-11-01 PROBLEM — H40.1112: Noted: 2021-11-01

## 2021-11-01 PROBLEM — H40.042: Noted: 2021-11-01

## 2021-11-01 PROCEDURE — 99213 OFFICE O/P EST LOW 20 MIN: CPT

## 2021-12-14 ENCOUNTER — FOLLOW UP (OUTPATIENT)
Dept: URBAN - METROPOLITAN AREA CLINIC 26 | Facility: CLINIC | Age: 66
End: 2021-12-14

## 2021-12-14 DIAGNOSIS — H44.23: ICD-10-CM

## 2021-12-14 DIAGNOSIS — H04.123: ICD-10-CM

## 2021-12-14 DIAGNOSIS — H43.812: ICD-10-CM

## 2021-12-14 DIAGNOSIS — H40.9: ICD-10-CM

## 2021-12-14 DIAGNOSIS — H40.042: ICD-10-CM

## 2021-12-14 DIAGNOSIS — H35.352: ICD-10-CM

## 2021-12-14 DIAGNOSIS — H35.371: ICD-10-CM

## 2021-12-14 PROCEDURE — 92014 COMPRE OPH EXAM EST PT 1/>: CPT

## 2021-12-14 PROCEDURE — 92250 FUNDUS PHOTOGRAPHY W/I&R: CPT

## 2021-12-14 PROCEDURE — 92134 CPTRZ OPH DX IMG PST SGM RTA: CPT

## 2021-12-14 RX ORDER — KETOROLAC TROMETHAMINE 5 MG/ML: 1 SOLUTION OPHTHALMIC

## 2021-12-14 ASSESSMENT — TONOMETRY
OS_IOP_MMHG: 11
OD_IOP_MMHG: 10

## 2021-12-14 ASSESSMENT — VISUAL ACUITY
OS_SC: CF 2FT
OD_CC: 20/40-2

## 2021-12-15 ENCOUNTER — IMPORTED ENCOUNTER (OUTPATIENT)
Dept: URBAN - METROPOLITAN AREA CLINIC 31 | Facility: CLINIC | Age: 66
End: 2021-12-15

## 2021-12-15 PROBLEM — H40.042: Noted: 2021-12-15

## 2021-12-15 PROBLEM — Z96.1: Noted: 2021-12-15

## 2021-12-15 PROBLEM — H44.23: Noted: 2021-12-15

## 2021-12-15 PROBLEM — H18.601: Noted: 2021-12-15

## 2021-12-15 PROBLEM — H40.1112: Noted: 2021-12-15

## 2021-12-15 PROBLEM — Z94.7: Noted: 2021-12-15

## 2021-12-15 PROCEDURE — 99214 OFFICE O/P EST MOD 30 MIN: CPT

## 2022-03-08 ENCOUNTER — FOLLOW UP (OUTPATIENT)
Dept: URBAN - METROPOLITAN AREA CLINIC 26 | Facility: CLINIC | Age: 67
End: 2022-03-08

## 2022-03-08 VITALS — HEIGHT: 66 IN | BODY MASS INDEX: 23.3 KG/M2 | WEIGHT: 145 LBS

## 2022-03-08 DIAGNOSIS — H43.812: ICD-10-CM

## 2022-03-08 DIAGNOSIS — H40.9: ICD-10-CM

## 2022-03-08 DIAGNOSIS — Z94.7: ICD-10-CM

## 2022-03-08 DIAGNOSIS — H35.352: ICD-10-CM

## 2022-03-08 DIAGNOSIS — H35.371: ICD-10-CM

## 2022-03-08 DIAGNOSIS — H44.23: ICD-10-CM

## 2022-03-08 DIAGNOSIS — H40.042: ICD-10-CM

## 2022-03-08 DIAGNOSIS — H25.11: ICD-10-CM

## 2022-03-08 DIAGNOSIS — H04.123: ICD-10-CM

## 2022-03-08 PROCEDURE — 92012 INTRM OPH EXAM EST PATIENT: CPT

## 2022-03-08 PROCEDURE — 92134 CPTRZ OPH DX IMG PST SGM RTA: CPT

## 2022-03-08 ASSESSMENT — VISUAL ACUITY
OS_PH: 20/200-2
OS_SC: 20/400+1
OD_CC: 20/40

## 2022-03-08 ASSESSMENT — TONOMETRY
OD_IOP_MMHG: 16
OS_IOP_MMHG: 16

## 2022-04-02 ASSESSMENT — VISUAL ACUITY
OS_SC: 20/30-1
OU_SC: 20/30
OS_SC: 20/50-1
OS_PH: SC 20/400
OS_CC: 20/400
OS_CC: 20/200
OD_PH: CC 20/30
OS_PH: SC 20/40 -2
OD_SC: 20/40+1
OD_SC: 20/40+2
OS_CC: 20/400-1
OS_SC: CF@2'
OS_CC: 20/400
OS_SC: 20/25-2
OD_SC: 20/30-2
OS_CC: 20/150-2
OD_SC: 20/40-1
OU_CC: J5
OD_SC: 20/40-2
OD_SC: 20/30-2
OD_PH: CC 20/30
OD_CC: 20/50
OD_SC: 20/40
OD_PH: CC 20/40 -2
OS_SC: CF@2'
OD_SC: 20/50
OS_GLARE: 20/30+1
OD_GLARE: 20/30
OS_SC: 20/40-2
OU_SC: 20/30-1
OD_SC: 20/40
OS_GLARE: 20/50
OS_SC: 20/40-1
OS_GLARE: 20/400
OD_SC: 20/30
OS_CC: 20/400
OD_SC: 20/30+1
OS_SC: 20/50-2
OD_SC: 20/50-2
OS_CC: 20/400
OU_SC: 20/40
OS_SC: 20/60
OD_SC: 20/30-2
OU_CC: 20/50
OS_SC: 20/40
OS_SC: 20/100
OS_GLARE: 20/50-2
OD_SC: 20/50-2
OD_GLARE: 20/30-1
OD_SC: 20/40+1
OS_GLARE: 20/80-2
OS_CC: 20/100-1
OS_CC: 20/400
OS_GLARE: 20/400
OD_SC: 20/30-2
OD_SC: 20/40-2
OS_SC: 20/50
OD_SC: 20/30-2
OD_SC: 20/30-2
OD_SC: 20/30
OD_SC: 20/40+2
OS_SC: 20/30-1
OU_SC: 20/50
OS_SC: 20/80
OD_GLARE: 20/30-2
OS_SC: 20/60-1
OD_SC: 20/30-2
OD_CC: 20/40-2
OD_SC: 20/30
OD_CC: 20/50
OS_SC: 20/50-2
OU_SC: 20/40-2
OS_SC: 20/40
OU_SC: 20/40+1
OD_SC: 20/30-2
OD_SC: 20/30-2
OD_PH: CC 20/40
OD_SC: 20/30-2
OD_SC: 20/50+1
OS_CC: 20/70
OS_CC: CF@6''
OD_PH: SC 20/30
OD_SC: 20/30
OS_PH: SC 20/40
OS_SC: 20/40
OD_SC: 20/40
OS_GLARE: 20/50-2
OD_SC: 20/40+2
OS_SC: 20/50+1
OS_CC: 20/150
OS_SC: 20/40
OS_SC: 20/40+1
OD_SC: 20/60
OS_CC: 20/100+1
OD_SC: 20/40
OS_SC: 20/60+1
OS_PH: CC 20/40
OS_CC: CF@4'
OS_CC: 20/400
OS_CC: CF@4'
OS_SC: 20/100-1
OD_SC: 20/30-2
OS_SC: 20/150-2
OD_SC: 20/40
OD_CC: 20/30-1
OD_GLARE: 20/30-1
OS_SC: 20/30-2
OS_PH: SC 20/60 -2
OS_GLARE: 20/30-2
OS_SC: 20/40-1
OD_SC: 20/40
OS_SC: 20/30-1
OU_SC: 20/40
OS_PH: SC 20/80
OS_CC: 20/400
OD_SC: 20/50-2
OD_SC: 20/50-2
OD_SC: 20/40
OD_SC: 20/50+1
OS_PH: SC 20/60 -2
OS_SC: 20/40
OD_SC: 20/40
OS_SC: 20/30-1
OD_SC: 20/40
OS_AM: 20/200
OD_SC: 20/30-1
OD_SC: 20/40-1
OS_GLARE: 20/80-2
OS_CC: 20/400
OS_CC: 20/200
OS_CC: 20/50
OS_SC: 20/30
OS_SC: 20/400
OD_SC: 20/40-1
OD_SC: 20/40
OS_SC: 20/30-1
OD_SC: 20/30+1
OS_GLARE: 20/50

## 2022-04-02 ASSESSMENT — TONOMETRY
OS_IOP_MMHG: 15
OD_IOP_MMHG: 10
OD_IOP_MMHG: 9
OS_IOP_MMHG: 14
OD_IOP_MMHG: 10
OD_IOP_MMHG: 12
OD_IOP_MMHG: 6
OS_IOP_MMHG: 36
OS_IOP_MMHG: 12
OD_IOP_MMHG: 12
OD_IOP_MMHG: 12
OS_IOP_MMHG: 18
OS_IOP_MMHG: 11
OS_IOP_MMHG: 21
OS_IOP_MMHG: 23
OS_IOP_MMHG: 13
OD_IOP_MMHG: 15
OS_IOP_MMHG: 11
OS_IOP_MMHG: 14
OS_IOP_MMHG: 15
OS_IOP_MMHG: 12
OD_IOP_MMHG: 11
OS_IOP_MMHG: 12
OS_IOP_MMHG: 13
OS_IOP_MMHG: 13
OS_IOP_MMHG: 18
OS_IOP_MMHG: 13
OS_IOP_MMHG: 16
OS_IOP_MMHG: 14
OS_IOP_MMHG: 14
OD_IOP_MMHG: 11
OS_IOP_MMHG: 13
OD_IOP_MMHG: 12
OS_IOP_MMHG: 11
OS_IOP_MMHG: 19
OS_IOP_MMHG: 20
OD_IOP_MMHG: 11
OS_IOP_MMHG: 20
OD_IOP_MMHG: 11
OS_IOP_MMHG: 10
OS_IOP_MMHG: 12
OS_IOP_MMHG: 12
OS_IOP_MMHG: 18
OS_IOP_MMHG: 10
OD_IOP_MMHG: 14
OD_IOP_MMHG: 10
OS_IOP_MMHG: 11
OS_IOP_MMHG: 19
OD_IOP_MMHG: 14
OD_IOP_MMHG: 8
OS_IOP_MMHG: 19
OD_IOP_MMHG: 10
OD_IOP_MMHG: 11
OD_IOP_MMHG: 12
OD_IOP_MMHG: 11
OD_IOP_MMHG: 14
OS_IOP_MMHG: 18
OD_IOP_MMHG: 10
OD_IOP_MMHG: 10
OS_IOP_MMHG: 14
OS_IOP_MMHG: 13
OD_IOP_MMHG: 12
OS_IOP_MMHG: 13
OS_IOP_MMHG: 14
OD_IOP_MMHG: 9
OS_IOP_MMHG: 21
OD_IOP_MMHG: 13
OD_IOP_MMHG: 13
OS_IOP_MMHG: 30
OD_IOP_MMHG: 8
OS_IOP_MMHG: 15
OD_IOP_MMHG: 11
OS_IOP_MMHG: 22
OS_IOP_MMHG: 22
OS_IOP_MMHG: 19

## 2022-04-05 ENCOUNTER — ESTABLISHED PATIENT (OUTPATIENT)
Dept: URBAN - METROPOLITAN AREA CLINIC 29 | Facility: CLINIC | Age: 67
End: 2022-04-05

## 2022-04-05 DIAGNOSIS — H35.371: ICD-10-CM

## 2022-04-05 DIAGNOSIS — H25.11: ICD-10-CM

## 2022-04-05 DIAGNOSIS — H40.042: ICD-10-CM

## 2022-04-05 DIAGNOSIS — H43.812: ICD-10-CM

## 2022-04-05 DIAGNOSIS — H02.834: ICD-10-CM

## 2022-04-05 DIAGNOSIS — H35.352: ICD-10-CM

## 2022-04-05 DIAGNOSIS — H04.123: ICD-10-CM

## 2022-04-05 DIAGNOSIS — H02.831: ICD-10-CM

## 2022-04-05 DIAGNOSIS — Z94.7: ICD-10-CM

## 2022-04-05 PROCEDURE — 99214 OFFICE O/P EST MOD 30 MIN: CPT

## 2022-04-05 ASSESSMENT — TONOMETRY
OS_IOP_MMHG: 16
OD_IOP_MMHG: 12

## 2022-04-05 ASSESSMENT — VISUAL ACUITY
OD_CC: 20/40-2
OS_CC: CF 1FT

## 2022-05-03 ENCOUNTER — FOLLOW UP (OUTPATIENT)
Dept: URBAN - METROPOLITAN AREA CLINIC 26 | Facility: CLINIC | Age: 67
End: 2022-05-03

## 2022-05-03 DIAGNOSIS — H35.371: ICD-10-CM

## 2022-05-03 DIAGNOSIS — H04.123: ICD-10-CM

## 2022-05-03 DIAGNOSIS — H35.352: ICD-10-CM

## 2022-05-03 DIAGNOSIS — H40.042: ICD-10-CM

## 2022-05-03 DIAGNOSIS — H44.23: ICD-10-CM

## 2022-05-03 DIAGNOSIS — H40.9: ICD-10-CM

## 2022-05-03 DIAGNOSIS — H43.812: ICD-10-CM

## 2022-05-03 PROCEDURE — 92014 COMPRE OPH EXAM EST PT 1/>: CPT

## 2022-05-03 PROCEDURE — 92134 CPTRZ OPH DX IMG PST SGM RTA: CPT

## 2022-05-03 ASSESSMENT — VISUAL ACUITY: OD_CC: 20/40-1

## 2022-05-03 ASSESSMENT — TONOMETRY
OS_IOP_MMHG: 20
OD_IOP_MMHG: 18

## 2022-05-05 ENCOUNTER — FOLLOW UP (OUTPATIENT)
Dept: URBAN - METROPOLITAN AREA CLINIC 29 | Facility: CLINIC | Age: 67
End: 2022-05-05

## 2022-05-05 DIAGNOSIS — Z94.7: ICD-10-CM

## 2022-05-05 DIAGNOSIS — H40.9: ICD-10-CM

## 2022-05-05 DIAGNOSIS — Z46.0: ICD-10-CM

## 2022-05-05 PROCEDURE — 92310F

## 2022-05-05 ASSESSMENT — TONOMETRY
OS_IOP_MMHG: 17
OD_IOP_MMHG: 13

## 2022-05-05 ASSESSMENT — VISUAL ACUITY: OD_CC: 20/70

## 2022-10-19 ENCOUNTER — COMPREHENSIVE EXAM (OUTPATIENT)
Dept: URBAN - METROPOLITAN AREA CLINIC 29 | Facility: CLINIC | Age: 67
End: 2022-10-19

## 2022-10-19 DIAGNOSIS — H25.11: ICD-10-CM

## 2022-10-19 DIAGNOSIS — Z94.7: ICD-10-CM

## 2022-10-19 DIAGNOSIS — H40.042: ICD-10-CM

## 2022-10-19 DIAGNOSIS — H52.11: ICD-10-CM

## 2022-10-19 DIAGNOSIS — H40.9: ICD-10-CM

## 2022-10-19 PROCEDURE — 99214 OFFICE O/P EST MOD 30 MIN: CPT

## 2022-10-19 PROCEDURE — 92310-6CV

## 2022-10-19 ASSESSMENT — TONOMETRY
OD_IOP_MMHG: 11
OS_IOP_MMHG: 14

## 2022-10-19 ASSESSMENT — VISUAL ACUITY
OD_CC: 20/40
OU_CC: J7

## 2022-11-01 ENCOUNTER — COMPREHENSIVE EXAM (OUTPATIENT)
Dept: URBAN - METROPOLITAN AREA CLINIC 26 | Facility: CLINIC | Age: 67
End: 2022-11-01

## 2022-11-01 VITALS — BODY MASS INDEX: 23.3 KG/M2 | HEIGHT: 66 IN | WEIGHT: 145 LBS

## 2022-11-01 DIAGNOSIS — H40.9: ICD-10-CM

## 2022-11-01 DIAGNOSIS — H04.123: ICD-10-CM

## 2022-11-01 DIAGNOSIS — H43.812: ICD-10-CM

## 2022-11-01 DIAGNOSIS — H35.352: ICD-10-CM

## 2022-11-01 DIAGNOSIS — H40.042: ICD-10-CM

## 2022-11-01 DIAGNOSIS — H35.371: ICD-10-CM

## 2022-11-01 DIAGNOSIS — H44.23: ICD-10-CM

## 2022-11-01 PROCEDURE — 92014 COMPRE OPH EXAM EST PT 1/>: CPT

## 2022-11-01 PROCEDURE — 92134 CPTRZ OPH DX IMG PST SGM RTA: CPT

## 2022-11-01 ASSESSMENT — VISUAL ACUITY: OD_SC: 20/50-1

## 2022-11-01 ASSESSMENT — TONOMETRY
OS_IOP_MMHG: 20
OD_IOP_MMHG: 10

## 2022-11-17 ENCOUNTER — PREPPED CHART (OUTPATIENT)
Dept: URBAN - METROPOLITAN AREA CLINIC 29 | Facility: CLINIC | Age: 67
End: 2022-11-17

## 2022-11-18 ENCOUNTER — CONTACT LENSES/GLASSES VISIT (OUTPATIENT)
Dept: URBAN - METROPOLITAN AREA CLINIC 29 | Facility: CLINIC | Age: 67
End: 2022-11-18

## 2022-11-18 DIAGNOSIS — H52.11: ICD-10-CM

## 2022-11-18 DIAGNOSIS — Z94.7: ICD-10-CM

## 2022-11-18 PROCEDURE — 92015GRNC REFRACTION GLASSES RECHECK - NO CHARGE

## 2022-11-18 ASSESSMENT — VISUAL ACUITY: OD_CC: 20/30

## 2023-05-01 ENCOUNTER — FOLLOW UP (OUTPATIENT)
Dept: URBAN - METROPOLITAN AREA CLINIC 26 | Facility: CLINIC | Age: 68
End: 2023-05-01

## 2023-05-01 VITALS — HEIGHT: 66 IN | BODY MASS INDEX: 23.3 KG/M2 | WEIGHT: 145 LBS

## 2023-05-01 DIAGNOSIS — H35.352: ICD-10-CM

## 2023-05-01 DIAGNOSIS — H43.812: ICD-10-CM

## 2023-05-01 DIAGNOSIS — D31.31: ICD-10-CM

## 2023-05-01 DIAGNOSIS — H40.042: ICD-10-CM

## 2023-05-01 DIAGNOSIS — H35.371: ICD-10-CM

## 2023-05-01 DIAGNOSIS — H40.9: ICD-10-CM

## 2023-05-01 DIAGNOSIS — H44.23: ICD-10-CM

## 2023-05-01 DIAGNOSIS — H04.123: ICD-10-CM

## 2023-05-01 PROCEDURE — 92014 COMPRE OPH EXAM EST PT 1/>: CPT

## 2023-05-01 PROCEDURE — 92250 FUNDUS PHOTOGRAPHY W/I&R: CPT

## 2023-05-01 PROCEDURE — 92134 CPTRZ OPH DX IMG PST SGM RTA: CPT

## 2023-05-01 ASSESSMENT — VISUAL ACUITY
OS_SC: CF 1FT
OD_CC: 20/50
OD_PH: 20/40

## 2023-05-01 ASSESSMENT — TONOMETRY
OD_IOP_MMHG: 10
OS_IOP_MMHG: 14

## 2023-11-01 ENCOUNTER — FOLLOW UP (OUTPATIENT)
Dept: URBAN - METROPOLITAN AREA CLINIC 26 | Facility: CLINIC | Age: 68
End: 2023-11-01

## 2023-11-01 DIAGNOSIS — H04.123: ICD-10-CM

## 2023-11-01 DIAGNOSIS — H35.352: ICD-10-CM

## 2023-11-01 DIAGNOSIS — H40.9: ICD-10-CM

## 2023-11-01 DIAGNOSIS — H43.812: ICD-10-CM

## 2023-11-01 DIAGNOSIS — D31.31: ICD-10-CM

## 2023-11-01 DIAGNOSIS — H44.23: ICD-10-CM

## 2023-11-01 DIAGNOSIS — H35.371: ICD-10-CM

## 2023-11-01 DIAGNOSIS — H40.042: ICD-10-CM

## 2023-11-01 PROCEDURE — 92250 FUNDUS PHOTOGRAPHY W/I&R: CPT

## 2023-11-01 PROCEDURE — 92014 COMPRE OPH EXAM EST PT 1/>: CPT

## 2023-11-01 PROCEDURE — 92134 CPTRZ OPH DX IMG PST SGM RTA: CPT

## 2023-11-01 ASSESSMENT — VISUAL ACUITY
OD_CC: 20/40
OS_SC: CF 4FT

## 2023-11-01 ASSESSMENT — TONOMETRY
OS_IOP_MMHG: 11
OD_IOP_MMHG: 14

## 2024-01-10 ENCOUNTER — ESTABLISHED PATIENT (OUTPATIENT)
Dept: URBAN - METROPOLITAN AREA CLINIC 29 | Facility: CLINIC | Age: 69
End: 2024-01-10

## 2024-01-10 DIAGNOSIS — Z94.7: ICD-10-CM

## 2024-01-10 DIAGNOSIS — H44.23: ICD-10-CM

## 2024-01-10 DIAGNOSIS — H25.11: ICD-10-CM

## 2024-01-10 DIAGNOSIS — D31.31: ICD-10-CM

## 2024-01-10 DIAGNOSIS — H52.221: ICD-10-CM

## 2024-01-10 DIAGNOSIS — H52.13: ICD-10-CM

## 2024-01-10 DIAGNOSIS — H43.812: ICD-10-CM

## 2024-01-10 DIAGNOSIS — H40.042: ICD-10-CM

## 2024-01-10 DIAGNOSIS — H02.834: ICD-10-CM

## 2024-01-10 DIAGNOSIS — H02.831: ICD-10-CM

## 2024-01-10 DIAGNOSIS — H35.352: ICD-10-CM

## 2024-01-10 DIAGNOSIS — H04.123: ICD-10-CM

## 2024-01-10 DIAGNOSIS — H35.371: ICD-10-CM

## 2024-01-10 PROCEDURE — 92310-3 LEVEL 3 CONTACT LENS MANAGEMENT

## 2024-01-10 PROCEDURE — 99213 OFFICE O/P EST LOW 20 MIN: CPT

## 2024-02-08 ENCOUNTER — CONTACT LENSES/GLASSES VISIT (OUTPATIENT)
Dept: URBAN - METROPOLITAN AREA CLINIC 29 | Facility: CLINIC | Age: 69
End: 2024-02-08

## 2024-02-08 DIAGNOSIS — H52.13: ICD-10-CM

## 2024-02-08 PROCEDURE — 92310F

## 2024-02-19 ENCOUNTER — CONTACT LENSES/GLASSES VISIT (OUTPATIENT)
Dept: URBAN - METROPOLITAN AREA CLINIC 29 | Facility: CLINIC | Age: 69
End: 2024-02-19

## 2024-02-19 DIAGNOSIS — H52.13: ICD-10-CM

## 2024-02-19 DIAGNOSIS — H52.221: ICD-10-CM

## 2024-02-19 PROCEDURE — 92310F

## 2024-05-08 ENCOUNTER — FOLLOW UP (OUTPATIENT)
Dept: URBAN - METROPOLITAN AREA CLINIC 26 | Facility: CLINIC | Age: 69
End: 2024-05-08

## 2024-05-08 VITALS — HEIGHT: 66 IN | BODY MASS INDEX: 23.78 KG/M2 | WEIGHT: 148 LBS

## 2024-05-08 DIAGNOSIS — H40.042: ICD-10-CM

## 2024-05-08 DIAGNOSIS — H25.11: ICD-10-CM

## 2024-05-08 DIAGNOSIS — H44.23: ICD-10-CM

## 2024-05-08 DIAGNOSIS — H40.9: ICD-10-CM

## 2024-05-08 DIAGNOSIS — H02.831: ICD-10-CM

## 2024-05-08 DIAGNOSIS — H35.352: ICD-10-CM

## 2024-05-08 DIAGNOSIS — H04.123: ICD-10-CM

## 2024-05-08 DIAGNOSIS — H43.812: ICD-10-CM

## 2024-05-08 DIAGNOSIS — H02.834: ICD-10-CM

## 2024-05-08 DIAGNOSIS — Z94.7: ICD-10-CM

## 2024-05-08 DIAGNOSIS — D31.31: ICD-10-CM

## 2024-05-08 DIAGNOSIS — H35.373: ICD-10-CM

## 2024-05-08 PROCEDURE — 92250 FUNDUS PHOTOGRAPHY W/I&R: CPT

## 2024-05-08 PROCEDURE — 92134 CPTRZ OPH DX IMG PST SGM RTA: CPT

## 2024-05-08 PROCEDURE — 92014 COMPRE OPH EXAM EST PT 1/>: CPT

## 2024-05-08 ASSESSMENT — TONOMETRY
OS_IOP_MMHG: 08
OD_IOP_MMHG: 14

## 2024-05-08 ASSESSMENT — VISUAL ACUITY: OD_CC: 20/50

## 2024-11-06 ENCOUNTER — COMPREHENSIVE EXAM (OUTPATIENT)
Dept: URBAN - METROPOLITAN AREA CLINIC 26 | Facility: CLINIC | Age: 69
End: 2024-11-06

## 2024-11-06 DIAGNOSIS — Z94.7: ICD-10-CM

## 2024-11-06 DIAGNOSIS — H35.373: ICD-10-CM

## 2024-11-06 DIAGNOSIS — H04.123: ICD-10-CM

## 2024-11-06 DIAGNOSIS — H02.834: ICD-10-CM

## 2024-11-06 DIAGNOSIS — D31.31: ICD-10-CM

## 2024-11-06 DIAGNOSIS — H35.352: ICD-10-CM

## 2024-11-06 DIAGNOSIS — H40.9: ICD-10-CM

## 2024-11-06 DIAGNOSIS — H40.042: ICD-10-CM

## 2024-11-06 DIAGNOSIS — H02.831: ICD-10-CM

## 2024-11-06 DIAGNOSIS — H25.11: ICD-10-CM

## 2024-11-06 DIAGNOSIS — H43.812: ICD-10-CM

## 2024-11-06 DIAGNOSIS — H44.23: ICD-10-CM

## 2024-11-06 PROCEDURE — 92134 CPTRZ OPH DX IMG PST SGM RTA: CPT

## 2024-11-06 PROCEDURE — 92014 COMPRE OPH EXAM EST PT 1/>: CPT

## 2024-11-06 PROCEDURE — 92250 FUNDUS PHOTOGRAPHY W/I&R: CPT | Mod: 59

## 2025-05-30 ENCOUNTER — CONTACT LENSES/GLASSES VISIT (OUTPATIENT)
Age: 70
End: 2025-05-30

## 2025-05-30 DIAGNOSIS — H52.13: ICD-10-CM

## 2025-05-30 PROCEDURE — 92015GRNC REFRACTION GLASSES RECHECK - NO CHARGE: Mod: NC
